# Patient Record
Sex: FEMALE | Race: WHITE | NOT HISPANIC OR LATINO | URBAN - METROPOLITAN AREA
[De-identification: names, ages, dates, MRNs, and addresses within clinical notes are randomized per-mention and may not be internally consistent; named-entity substitution may affect disease eponyms.]

---

## 2018-10-10 ENCOUNTER — APPOINTMENT (RX ONLY)
Dept: URBAN - METROPOLITAN AREA CLINIC 12 | Facility: CLINIC | Age: 78
Setting detail: DERMATOLOGY
End: 2018-10-10

## 2018-10-10 DIAGNOSIS — L30.9 DERMATITIS, UNSPECIFIED: ICD-10-CM

## 2018-10-10 PROCEDURE — ? KOH PREP

## 2018-10-10 PROCEDURE — ? COUNSELING

## 2018-10-10 PROCEDURE — ? PRESCRIPTION

## 2018-10-10 PROCEDURE — 87220 TISSUE EXAM FOR FUNGI: CPT

## 2018-10-10 PROCEDURE — 99202 OFFICE O/P NEW SF 15 MIN: CPT

## 2018-10-10 RX ORDER — KETOCONAZOLE 20 MG/G
CREAM TOPICAL
Qty: 1 | Refills: 2 | Status: ERX | COMMUNITY
Start: 2018-10-10

## 2018-10-10 RX ORDER — CLOBETASOL PROPIONATE 0.5 MG/G
OINTMENT TOPICAL
Qty: 1 | Refills: 3 | Status: ERX | COMMUNITY
Start: 2018-10-10

## 2018-10-10 RX ADMIN — KETOCONAZOLE: 20 CREAM TOPICAL at 00:00

## 2018-10-10 RX ADMIN — CLOBETASOL PROPIONATE: 0.5 OINTMENT TOPICAL at 00:00

## 2018-10-10 ASSESSMENT — LOCATION ZONE DERM: LOCATION ZONE: TOE

## 2018-10-10 ASSESSMENT — LOCATION SIMPLE DESCRIPTION DERM: LOCATION SIMPLE: LEFT GREAT TOE

## 2018-10-10 ASSESSMENT — LOCATION DETAILED DESCRIPTION DERM: LOCATION DETAILED: LEFT DORSAL GREAT TOE

## 2018-10-10 NOTE — PROCEDURE: KOH PREP
Detail Level: Zone
Koh Procedure Text (Tissue Harvesting Technique): A 15-blade scalpel was used to scrape the skin. The skin scrapings were placed on a glass slide, covered with a coverslip and a KOH solution was applied.
Showing: fungal hyphal elements: positive
Koh Intro Text (From The.....): A KOH prep was ordered and evaluated from the

## 2018-10-10 NOTE — PROCEDURE: COUNSELING
Detail Level: Detailed
Patient Specific Counseling (Will Not Stick From Patient To Patient): Diluted vinegar soaks qhs. Instructed to use ketokonazole cream qam 2wk, then clobet as prescribed after fungus has resolved. Wear cotton socks.

## 2018-11-07 ENCOUNTER — APPOINTMENT (RX ONLY)
Dept: URBAN - METROPOLITAN AREA CLINIC 12 | Facility: CLINIC | Age: 78
Setting detail: DERMATOLOGY
End: 2018-11-07

## 2018-11-07 DIAGNOSIS — B35.3 TINEA PEDIS: ICD-10-CM | Status: IMPROVED

## 2018-11-07 PROCEDURE — ? COUNSELING

## 2018-11-07 PROCEDURE — ? PRESCRIPTION MEDICATION MANAGEMENT

## 2018-11-07 PROCEDURE — 99213 OFFICE O/P EST LOW 20 MIN: CPT

## 2018-11-07 NOTE — PROCEDURE: PRESCRIPTION MEDICATION MANAGEMENT
Render In Strict Bullet Format?: No
Continue Regimen: Ketoconazole cream daily for 4 additional weeks
Plan: Clobetasol ointment prn
Detail Level: Zone

## 2023-05-01 ENCOUNTER — APPOINTMENT (RX ONLY)
Dept: URBAN - METROPOLITAN AREA CLINIC 12 | Facility: CLINIC | Age: 83
Setting detail: DERMATOLOGY
End: 2023-05-01

## 2023-05-01 DIAGNOSIS — L82.0 INFLAMED SEBORRHEIC KERATOSIS: ICD-10-CM

## 2023-05-01 PROCEDURE — ? BIOPSY BY SHAVE METHOD

## 2023-05-01 PROCEDURE — 11102 TANGNTL BX SKIN SINGLE LES: CPT

## 2023-05-01 PROCEDURE — ? COUNSELING

## 2023-05-01 ASSESSMENT — LOCATION SIMPLE DESCRIPTION DERM: LOCATION SIMPLE: LEFT THIGH

## 2023-05-01 ASSESSMENT — LOCATION DETAILED DESCRIPTION DERM: LOCATION DETAILED: LEFT ANTERIOR DISTAL THIGH

## 2023-05-01 ASSESSMENT — LOCATION ZONE DERM: LOCATION ZONE: LEG

## 2023-05-01 NOTE — PROCEDURE: BIOPSY BY SHAVE METHOD
Body Location Override (Optional - Billing Will Still Be Based On Selected Body Map Location If Applicable): ?left thigh
Detail Level: Detailed
Depth Of Biopsy: dermis
Was A Bandage Applied: Yes
Size Of Lesion In Cm: 0
Biopsy Type: H and E
Biopsy Method: Dermablade
Anesthesia Type: 1% lidocaine with epinephrine
Anesthesia Volume In Cc (Will Not Render If 0): 0.5
Hemostasis: Drysol
Wound Care: Petrolatum
Dressing: bandage
Destruction After The Procedure: No
Type Of Destruction Used: Curettage
Curettage Text: The wound bed was treated with curettage after the biopsy was performed.
Cryotherapy Text: The wound bed was treated with cryotherapy after the biopsy was performed.
Electrodesiccation Text: The wound bed was treated with electrodesiccation after the biopsy was performed.
Electrodesiccation And Curettage Text: The wound bed was treated with electrodesiccation and curettage after the biopsy was performed.
Silver Nitrate Text: The wound bed was treated with silver nitrate after the biopsy was performed.
Lab: [id number]
Consent: Verbal consent was obtained and risks were reviewed including but not limited to scarring, infection, bleeding, scabbing, incomplete removal, nerve damage and allergy to anesthesia.
Post-Care Instructions: I reviewed with the patient in detail post-care instructions; wound care hand out provided.
Notification Instructions: Patient will be notified of biopsy results. However, patient instructed to call the office if not contacted within 2 weeks.
Billing Type: Third-Party Bill
Information: Selecting Yes will display possible errors in your note based on the variables you have selected. This validation is only offered as a suggestion for you. PLEASE NOTE THAT THE VALIDATION TEXT WILL BE REMOVED WHEN YOU FINALIZE YOUR NOTE. IF YOU WANT TO FAX A PRELIMINARY NOTE YOU WILL NEED TO TOGGLE THIS TO 'NO' IF YOU DO NOT WANT IT IN YOUR FAXED NOTE.

## 2025-04-17 ENCOUNTER — APPOINTMENT (OUTPATIENT)
Dept: URBAN - METROPOLITAN AREA CLINIC 12 | Facility: CLINIC | Age: 85
Setting detail: DERMATOLOGY
End: 2025-04-17

## 2025-04-17 DIAGNOSIS — D22 MELANOCYTIC NEVI: ICD-10-CM

## 2025-04-17 PROBLEM — D22.39 MELANOCYTIC NEVI OF OTHER PARTS OF FACE: Status: ACTIVE | Noted: 2025-04-17

## 2025-04-17 PROCEDURE — ? COUNSELING

## 2025-04-17 PROCEDURE — ? SHAVE REMOVAL

## 2025-04-17 PROCEDURE — 11311 SHAVE SKIN LESION 0.6-1.0 CM: CPT

## 2025-04-17 ASSESSMENT — LOCATION SIMPLE DESCRIPTION DERM: LOCATION SIMPLE: RIGHT CHEEK

## 2025-04-17 ASSESSMENT — LOCATION DETAILED DESCRIPTION DERM: LOCATION DETAILED: RIGHT INFERIOR CENTRAL MALAR CHEEK

## 2025-04-17 ASSESSMENT — LOCATION ZONE DERM: LOCATION ZONE: FACE

## 2025-04-17 NOTE — HPI: EVALUATION OF SKIN LESION(S)
What Type Of Note Output Would You Prefer (Optional)?: Bullet Format
Hpi Title: Evaluation of a Skin Lesion
How Severe Are Your Spot(S)?: moderate
Additional History: Chaperone -  () Mello \\n\\nConcerns with lesion on R malar cheek

## 2025-04-17 NOTE — PROCEDURE: SHAVE REMOVAL
Medical Necessity Information: It is in your best interest to select a reason for this procedure from the list below. All of these items fulfill various CMS LCD requirements except the new and changing color options.
Medical Necessity Clause: This procedure was medically necessary because the lesion that was treated was:
Lab: -[id number]
Lab Facility: [id number]
Detail Level: Detailed
Was A Bandage Applied: Yes
Size Of Lesion In Cm (Required): 0.8
Depth Of Shave: dermis
Biopsy Method: scissors
Anesthesia Type: 1% lidocaine with epinephrine
Hemostasis: Aluminum Chloride
Wound Care: Petrolatum
Render Path Notes In Note?: No
Consent was obtained from the patient. The risks and benefits to therapy were discussed in detail. Specifically, the risks of infection, scarring, bleeding, prolonged wound healing, incomplete removal, allergy to anesthesia, nerve injury and recurrence were addressed. Prior to the procedure, the treatment site was clearly identified and confirmed by the patient. All components of Universal Protocol/PAUSE Rule completed.
Post-Care Instructions: I reviewed with the patient in detail post-care instructions. Patient is to keep the biopsy site dry overnight, and then apply bacitracin twice daily until healed. Patient may apply hydrogen peroxide soaks to remove any crusting.
Notification Instructions: Patient will be notified of pathology results. However, patient instructed to call the office if not contacted within 2 weeks.
Billing Type: Third-Party Bill